# Patient Record
Sex: FEMALE | Employment: FULL TIME | URBAN - METROPOLITAN AREA
[De-identification: names, ages, dates, MRNs, and addresses within clinical notes are randomized per-mention and may not be internally consistent; named-entity substitution may affect disease eponyms.]

---

## 2019-02-06 ENCOUNTER — HOSPITAL ENCOUNTER (EMERGENCY)
Facility: HOSPITAL | Age: 54
Discharge: HOME | End: 2019-02-06
Attending: EMERGENCY MEDICINE
Payer: OTHER MISCELLANEOUS

## 2019-02-06 VITALS
HEIGHT: 67 IN | BODY MASS INDEX: 31.86 KG/M2 | DIASTOLIC BLOOD PRESSURE: 89 MMHG | RESPIRATION RATE: 18 BRPM | TEMPERATURE: 98 F | HEART RATE: 70 BPM | SYSTOLIC BLOOD PRESSURE: 196 MMHG | WEIGHT: 203 LBS

## 2019-02-06 DIAGNOSIS — M54.2 NECK PAIN ON RIGHT SIDE: ICD-10-CM

## 2019-02-06 DIAGNOSIS — M54.12 RIGHT CERVICAL RADICULOPATHY: Primary | ICD-10-CM

## 2019-02-06 PROCEDURE — 63600175 PHARM REV CODE 636 W HCPCS: Performed by: NURSE PRACTITIONER

## 2019-02-06 PROCEDURE — 99284 EMERGENCY DEPT VISIT MOD MDM: CPT | Mod: 25

## 2019-02-06 PROCEDURE — 96372 THER/PROPH/DIAG INJ SC/IM: CPT

## 2019-02-06 RX ORDER — KETOROLAC TROMETHAMINE 30 MG/ML
30 INJECTION, SOLUTION INTRAMUSCULAR; INTRAVENOUS
Status: COMPLETED | OUTPATIENT
Start: 2019-02-06 | End: 2019-02-06

## 2019-02-06 RX ORDER — ORPHENADRINE CITRATE 30 MG/ML
60 INJECTION INTRAMUSCULAR; INTRAVENOUS
Status: COMPLETED | OUTPATIENT
Start: 2019-02-06 | End: 2019-02-06

## 2019-02-06 RX ORDER — TRAMADOL HYDROCHLORIDE 50 MG/1
50 TABLET ORAL EVERY 6 HOURS
COMMUNITY
End: 2020-02-06

## 2019-02-06 RX ADMIN — ORPHENADRINE CITRATE 60 MG: 30 INJECTION INTRAMUSCULAR; INTRAVENOUS at 05:02

## 2019-02-06 RX ADMIN — KETOROLAC TROMETHAMINE 30 MG: 30 INJECTION, SOLUTION INTRAMUSCULAR at 05:02

## 2019-02-06 NOTE — ED TRIAGE NOTES
Patient reports right sided neck pain that radiates down right arm to right hand that began at approximately 1100 today.  Patient reports a hx of bulging discs in her neck.

## 2019-02-06 NOTE — ED PROVIDER NOTES
"Encounter Date: 2/6/2019    This is a SORT/MSE of a 53 y.o. female presenting to the ED with c/o right neck and arm pain. Pain worse with movement. Reports injury several months ago and pain has been ongoing since then.  No Chest Pain, SOB, Diaphoresis, or Nausea. Care will be transferred to an alternate provider when patient is roomed for a full evaluation and final disposition. JEAN Martinez, FNP-C 2/6/2019 3:01 PM    SCRIBE #1 NOTE: I, Ernesto Cruz, am scribing for, and in the presence of,  Jackson Mclaughlin NP. I have scribed the following portions of the note - Other sections scribed: HPI and ROS .       History     Chief Complaint   Patient presents with    Neck Pain     + R sided neck pain which radiates down R arm which started today at work, increased pain with movement. Hx of trauma, pain increased.      CC: Neck Pain     HPI: This 53 y.o F with HTN presents to the ED c/o acute on chronic right neck pan radiating down the right arm which began today. She describes her arm pain as "tingling." The pt states she began experiencing neck pain s/p a fall at work in July of 2018. She did receive an MRI which revealed a bulging disc in her neck and a slight tear in her rotator cuff. She did attend PT x3-4 months with no improvement because her pain is secondary to the bulging disc. Her othopedist recommend steroid injections, but her workers comp would not pay for it. The pt states she returned to work x2 weeks ago for light duty, but began cooking and lifting pots again which she suspects triggered the onset of her pain today. No new trauma. She notes that she usually takes her Rx'ed Tramadol Hydrochloride 50mg but can only take them at night due to side effects. She was previously Rx'ed gabapentin which she has at home, but she is unsure if she is allowed to combine both medications. She attempted tx with Aleve PTA with no relief.            The history is provided by the patient. No  was " used.     Review  of patient's allergies indicates:  No Known Allergies  Past Medical History:   Diagnosis Date    Hypertension      History reviewed. No pertinent surgical history.  Family History   Problem Relation Age of Onset    Hypertension Mother     Diabetes Mother     Hypertension Brother     Diabetes Brother     Hypertension Sister     Breast cancer Neg Hx     Colon cancer Neg Hx     Ovarian cancer Neg Hx      Social History     Tobacco Use    Smoking status: Never Smoker   Substance Use Topics    Alcohol use: Yes    Drug use: No     Review of Systems   Constitutional: Negative for chills, diaphoresis and fever.   HENT: Negative for rhinorrhea and sore throat.    Eyes: Negative for redness.   Respiratory: Negative for cough and shortness of breath.    Cardiovascular: Negative for chest pain.   Gastrointestinal: Negative for abdominal pain, diarrhea, nausea and vomiting.   Genitourinary: Negative for dysuria, frequency and urgency.   Musculoskeletal: Positive for neck pain (right side). Negative for back pain.        (+) R upper extremity pain   Skin: Negative for rash.   Psychiatric/Behavioral: The patient is not nervous/anxious.        Physical Exam     Initial Vitals [02/06/19 1501]   BP Pulse Resp Temp SpO2   (!) 196/89 70 18 97.9 °F (36.6 °C) --      MAP       --         Physical Exam    Nursing note and vitals reviewed.  Constitutional: Vital signs are normal. She appears well-developed and well-nourished. She is not diaphoretic. She is active and cooperative.  Non-toxic appearance. She does not have a sickly appearance. She does not appear ill. No distress.   Pleasant, well-appearing, no distress   HENT:   Head: Normocephalic and atraumatic.   Right Ear: External ear normal.   Left Ear: External ear normal.   Nose: Nose normal.   Eyes: Conjunctivae and EOM are normal. Right eye exhibits no discharge. Left eye exhibits no discharge.   Neck: Trachea normal, normal range of motion, full  passive range of motion without pain and phonation normal. Neck supple. No stridor present. Muscular tenderness (Right-sided) present. No tracheal tenderness and no spinous process tenderness present. No tracheal deviation, no edema, no erythema and normal range of motion present. No neck rigidity.   Cardiovascular: Normal rate.   Pulmonary/Chest: No stridor. No respiratory distress.   Abdominal: Soft. She exhibits no distension. There is no tenderness.   Musculoskeletal: Normal range of motion.        Cervical back: She exhibits tenderness and pain. She exhibits no bony tenderness and no deformity.   Right-sided neck pain radiating to the right shoulder and right upper extremity down to the hand.  Pain in the right upper extremity as described as a tingling, burning sensation.  Radial pulses are 2+ and equal bilaterally.  Strength is equal and normal in bilateral upper extremities. No posterior neck tenderness or midline tenderness.   Neurological: She is alert and oriented to person, place, and time. She has normal strength. No cranial nerve deficit or sensory deficit.   Skin: Skin is warm and dry.   Psychiatric: She has a normal mood and affect. Her behavior is normal. Judgment and thought content normal.         ED Course   Procedures  Labs Reviewed - No data to display       Imaging Results    None          Medical Decision Making:   History:   Old Medical Records: I decided to obtain old medical records.  Differential Diagnosis:   Fracture, dislocation, strain/sprain, paraspinal abscess, others  ED Management:  The is an emergent evaluation for a patient with right-sided neck pain radiating down the right upper extremity. I do not suspect an infectious, cancerous, or vascular etiology as the cause of the pain. The patient does not have a history of cancer or unexplained weight loss suggesting metastatic disease.  The patient does not have persistent fevers or night sweats. The patient is not immunocompromised  and the patient has not had any chronic steroid use or intravenous drug use.  I do not think this patient has an epidural abscess, osteomyelitis, or metastatic spine lesions. The patient has a normal neurological exam and does not show evidence of cord or root compression.  Strength is equal and normal in bilateral upper extremities. Radial pulses are 2+ bilaterally. Symptoms are clinically consistent with acute on chronic cervical radiculopathy.  Treated with Toradol and Norflex in the ED.      Given lack of new symptoms or new trauma I do not think emergent radiography with X-rays or CT scan is warranted at this time.  Advised patient to take at home pain medications as prescribed.  Advised patient to follow up with her PCP and orthopedic surgery for further evaluation management. ED return precautions given. All questions regarding diagnosis and plan were answered to the patient's fullest possible satisfaction. Patient expressed understanding of diagnosis, discharge instructions, and return precautions.    My attending physician was available for consultation during this case.            Scribe Attestation:   Scribe #1: I performed the above scribed service and the documentation accurately describes the services I performed. I attest to the accuracy of the note.    Attending Attestation:           Physician Attestation for Scribe:  Physician Attestation Statement for Scribe #1: I, Jackson Mclaughlin NP, reviewed documentation, as scribed by Ernesto Cruz in my presence, and it is both accurate and complete.                    Clinical Impression:   The primary encounter diagnosis was Right cervical radiculopathy. A diagnosis of Neck pain on right side was also pertinent to this visit.      Disposition:   Disposition: Discharged  Condition: Stable                        Jackson Mclaughlin NP  02/12/19 2002

## 2019-02-06 NOTE — DISCHARGE INSTRUCTIONS
Take your pain medications as needed only as prescribed.  Do not drive or drink alcohol when taking narcotics.    Follow-up with your orthopedic specialist for further treatment as discussed.  Return to the emergency department for any new or worsening symptoms or as needed for any additional concerns.    Thank you for coming to our Emergency Department today. It is important to remember that some problems are difficult to diagnose and may not be found during your first visit. Be sure to follow up with your primary care doctor.  If you do not have one, you may contact the one listed on your discharge paperwork or you may also call the [facility] Appointment Desk at [phone] to schedule an appointment with one.     Return to the ER with any questions/concerns, new/concerning symptoms, worsening or failure to improve. Do not drive or make any important decisions for 24 hours if you have received any pain medications, sedatives or mood altering drugs during your ER visit.

## 2020-02-06 ENCOUNTER — HOSPITAL ENCOUNTER (EMERGENCY)
Facility: HOSPITAL | Age: 55
Discharge: HOME | End: 2020-02-06
Attending: EMERGENCY MEDICINE
Payer: OTHER MISCELLANEOUS

## 2020-02-06 VITALS
BODY MASS INDEX: 31.55 KG/M2 | HEIGHT: 67 IN | OXYGEN SATURATION: 100 % | TEMPERATURE: 98 F | SYSTOLIC BLOOD PRESSURE: 118 MMHG | DIASTOLIC BLOOD PRESSURE: 56 MMHG | HEART RATE: 64 BPM | WEIGHT: 201 LBS | RESPIRATION RATE: 18 BRPM

## 2020-02-06 DIAGNOSIS — M25.511 RIGHT SHOULDER PAIN: ICD-10-CM

## 2020-02-06 DIAGNOSIS — S43.401A SPRAIN OF RIGHT SHOULDER, UNSPECIFIED SHOULDER SPRAIN TYPE, INITIAL ENCOUNTER: Primary | ICD-10-CM

## 2020-02-06 PROCEDURE — 99284 EMERGENCY DEPT VISIT MOD MDM: CPT | Mod: 25

## 2020-02-06 PROCEDURE — 25000003 PHARM REV CODE 250: Performed by: EMERGENCY MEDICINE

## 2020-02-06 RX ORDER — HYDROCODONE BITARTRATE AND ACETAMINOPHEN 5; 325 MG/1; MG/1
1 TABLET ORAL
Status: COMPLETED | OUTPATIENT
Start: 2020-02-06 | End: 2020-02-06

## 2020-02-06 RX ORDER — CLONIDINE HYDROCHLORIDE 0.1 MG/1
0.1 TABLET ORAL
Status: COMPLETED | OUTPATIENT
Start: 2020-02-06 | End: 2020-02-06

## 2020-02-06 RX ORDER — LOSARTAN POTASSIUM 50 MG/1
50 TABLET ORAL DAILY
Qty: 30 TABLET | Refills: 3 | Status: SHIPPED | OUTPATIENT
Start: 2020-02-06 | End: 2022-11-07 | Stop reason: ALTCHOICE

## 2020-02-06 RX ORDER — TRAMADOL HYDROCHLORIDE 50 MG/1
50 TABLET ORAL EVERY 6 HOURS PRN
Qty: 10 TABLET | Refills: 0 | Status: SHIPPED | OUTPATIENT
Start: 2020-02-06 | End: 2020-02-16

## 2020-02-06 RX ADMIN — CLONIDINE HYDROCHLORIDE 0.1 MG: 0.1 TABLET ORAL at 06:02

## 2020-02-06 RX ADMIN — HYDROCODONE BITARTRATE AND ACETAMINOPHEN 1 TABLET: 5; 325 TABLET ORAL at 06:02

## 2020-02-06 NOTE — ED TRIAGE NOTES
Pt arrived via EMS c/o right shoulder pain that radiates up into neck after lifting heavy boxes at work. Swelling and deformity noted to right shoulder, no crepitus noted. RUE in sling upon arrival. Skin color is WNL for pt's ethnicity, skin is cool to the touch, cap refill is <2 sec and no sensory deficits noted.

## 2020-02-06 NOTE — DISCHARGE INSTRUCTIONS
Please return immediately if youGet worse or if new problems develop.  Please follow-up with your primary care doctor this week.  Rest.  Use a heating pad.  Tramadol for pain. Please increase her losartan to 50 mg daily.  Rest.  Please also use Tylenol, 1000 mg every 8 hr.

## 2020-02-06 NOTE — ED PROVIDER NOTES
Encounter Date: 2/6/2020    SCRIBE #1 NOTE: I, Amarilys Rodriguez, am scribing for, and in the presence of,  Pascual Chao MD. I have scribed the following portions of the note - Other sections scribed: HPI and ROS.       History     Chief Complaint   Patient presents with    Shoulder Pain     right shoulder pain after picking up heavy boxes today     CC: Shoulder Pain    HPI: This 54 y.o female, with a medical history of hypertension, presents to the ED c/o acute, constant right shoulder pain. Pt reports that she was picking up heavy boxes in a freezer at work at 5:15 am this morning. She states that when she bent down to  more boxes she began to experience pain to the right shoulder. She notes that the pain has since been radiating into the the right upper arm as well as into the right side of the neck. No other extremity issues. Pt denies chest pain, cough, shortness of breath, abdominal pain, emesis, diarrhea, dysuria, sore throat, fever and chills. No other associated symptoms. No alleviating factors.     The history is provided by the patient.     Review of patient's allergies indicates:  No Known Allergies  Past Medical History:   Diagnosis Date    Hypertension      Past Surgical History:   Procedure Laterality Date    KNEE CARTILAGE SURGERY Bilateral     RETINAL DETACHMENT SURGERY Right      Family History   Problem Relation Age of Onset    Hypertension Mother     Diabetes Mother     Hypertension Brother     Diabetes Brother     Hypertension Sister     Breast cancer Neg Hx     Colon cancer Neg Hx     Ovarian cancer Neg Hx      Social History     Tobacco Use    Smoking status: Never Smoker    Smokeless tobacco: Never Used   Substance Use Topics    Alcohol use: Yes    Drug use: No     Review of Systems   Constitutional: Negative for chills and fever.   HENT: Negative for congestion, rhinorrhea and sore throat.    Eyes: Negative for visual disturbance.   Respiratory: Negative for cough and  shortness of breath.    Cardiovascular: Negative for chest pain.   Gastrointestinal: Negative for abdominal pain, diarrhea, nausea and vomiting.   Genitourinary: Negative for dysuria.   Musculoskeletal: Negative for back pain.        (+) right shoulder pain radiating into the right upper arm and into the right side of the neck   Skin: Negative for rash.   Neurological: Negative for dizziness, weakness and headaches.       Physical Exam     Initial Vitals [02/06/20 0630]   BP Pulse Resp Temp SpO2   (!) 208/92 63 17 97.8 °F (36.6 °C) 100 %      MAP       --         Physical Exam  The patient was examined specifically for the following:   General:No significant distress, Good color, Warm and dry. Head and neck:Scalp atraumatic, Neck supple. Neurological:Appropriate conversation, Gross motor deficits. Eyes:Conjugate gaze, Clear corneas. ENT: No epistaxis. Cardiac: Regular rate and rhythm, Grossly normal heart tones. Pulmonary: Wheezing, Rales. Gastrointestinal: Abdominal tenderness, Abdominal distention. Musculoskeletal: Extremity deformity, Apparent pain with range of motion of the joints. Skin: Rash.   The findings on examination were normal except for the following:  Patient has tenderness and pain with range of motion of the right shoulder.  She is tender into the humeral arm and up the right side of her neck.  The superior border the trapezius is tender.  ED Course   Procedures  Labs Reviewed - No data to display       Imaging Results          X-Ray Shoulder Trauma Right (Final result)  Result time 02/06/20 07:40:32    Final result by Brandy Singh MD (02/06/20 07:40:32)                 Impression:      No definite radiographic evidence of acute osseous injury within the limitations of the examination.      Electronically signed by: Brandy Singh MD  Date:    02/06/2020  Time:    07:40             Narrative:    EXAMINATION:  XR SHOULDER TRAUMA 3 VIEW RIGHT    CLINICAL HISTORY:  Pain in right  shoulder    TECHNIQUE:  Two views of the right shoulder were performed.    COMPARISON:  None    FINDINGS:  Please note examination is limited due to patient positioning.  Allowing for this, the humeral head appears appropriately seated within the glenoid.  No radiographic evidence of acute fracture.  The acromioclavicular joint appears maintained.                              Medical decision making:  Given the above, this patient presents to the emergency room with pain in the right shoulder.  X-rays failed to reveal significant abnormalities.  I believe this patient has a muscular shoulder strain.  She is also discovered to have high blood pressure today.  She is on losartan 25 mg.  I will increase her to 50 mg.  I will treat with Tramadol have the patient follow up with primary care.                    Scribe Attestation:   Scribe #1: I performed the above scribed service and the documentation accurately describes the services I performed. I attest to the accuracy of the note.                          Clinical Impression:       ICD-10-CM ICD-9-CM   1. Sprain of right shoulder, unspecified shoulder sprain type, initial encounter S43.401A 840.9   2. Right shoulder pain M25.511 719.41            I personally performed the services described in this documentation.  All medical record  entries made by the scribe are at my direction and in my presence.  Signed, Dr. Jeremie Chao MD  02/06/20 0878

## 2021-03-05 ENCOUNTER — IMMUNIZATION (OUTPATIENT)
Dept: PRIMARY CARE CLINIC | Facility: CLINIC | Age: 56
End: 2021-03-05

## 2021-03-05 DIAGNOSIS — Z23 NEED FOR VACCINATION: Primary | ICD-10-CM

## 2021-03-05 PROCEDURE — 91303 PR SARSCOV2 VAC AD26 .5ML IM: ICD-10-PCS | Mod: S$GLB,,, | Performed by: INTERNAL MEDICINE

## 2021-03-05 PROCEDURE — 0031A PR IMMUNIZ ADMIN, SARS-COV-2 COVID-19 VACC, 5X10VP/0.5ML: CPT | Mod: CV19,S$GLB,, | Performed by: INTERNAL MEDICINE

## 2021-03-05 PROCEDURE — 0031A PR IMMUNIZ ADMIN, SARS-COV-2 COVID-19 VACC, 5X10VP/0.5ML: ICD-10-PCS | Mod: CV19,S$GLB,, | Performed by: INTERNAL MEDICINE

## 2021-03-05 PROCEDURE — 91303 PR SARSCOV2 VAC AD26 .5ML IM: CPT | Mod: S$GLB,,, | Performed by: INTERNAL MEDICINE

## 2021-12-23 ENCOUNTER — HOSPITAL ENCOUNTER (EMERGENCY)
Facility: HOSPITAL | Age: 56
Discharge: HOME | End: 2021-12-23
Attending: EMERGENCY MEDICINE
Payer: COMMERCIAL

## 2021-12-23 VITALS
BODY MASS INDEX: 32.18 KG/M2 | RESPIRATION RATE: 18 BRPM | HEART RATE: 61 BPM | WEIGHT: 205 LBS | SYSTOLIC BLOOD PRESSURE: 150 MMHG | DIASTOLIC BLOOD PRESSURE: 72 MMHG | OXYGEN SATURATION: 100 % | TEMPERATURE: 98 F | HEIGHT: 67 IN

## 2021-12-23 DIAGNOSIS — R07.9 CHEST PAIN: ICD-10-CM

## 2021-12-23 DIAGNOSIS — R07.89 ATYPICAL CHEST PAIN: Primary | ICD-10-CM

## 2021-12-23 LAB
ALBUMIN SERPL BCP-MCNC: 3.9 G/DL (ref 3.5–5.2)
ALP SERPL-CCNC: 85 U/L (ref 55–135)
ALT SERPL W/O P-5'-P-CCNC: 14 U/L (ref 10–44)
ANION GAP SERPL CALC-SCNC: 10 MMOL/L (ref 8–16)
AST SERPL-CCNC: 17 U/L (ref 10–40)
B-HCG UR QL: NEGATIVE
BASOPHILS # BLD AUTO: 0.04 K/UL (ref 0–0.2)
BASOPHILS NFR BLD: 0.4 % (ref 0–1.9)
BILIRUB SERPL-MCNC: 0.3 MG/DL (ref 0.1–1)
BUN SERPL-MCNC: 16 MG/DL (ref 6–20)
CALCIUM SERPL-MCNC: 9.7 MG/DL (ref 8.7–10.5)
CHLORIDE SERPL-SCNC: 109 MMOL/L (ref 95–110)
CO2 SERPL-SCNC: 23 MMOL/L (ref 23–29)
CREAT SERPL-MCNC: 0.8 MG/DL (ref 0.5–1.4)
CTP QC/QA: YES
CTP QC/QA: YES
DIFFERENTIAL METHOD: ABNORMAL
EOSINOPHIL # BLD AUTO: 0.2 K/UL (ref 0–0.5)
EOSINOPHIL NFR BLD: 1.3 % (ref 0–8)
ERYTHROCYTE [DISTWIDTH] IN BLOOD BY AUTOMATED COUNT: 14.9 % (ref 11.5–14.5)
EST. GFR  (AFRICAN AMERICAN): >60 ML/MIN/1.73 M^2
EST. GFR  (NON AFRICAN AMERICAN): >60 ML/MIN/1.73 M^2
GLUCOSE SERPL-MCNC: 96 MG/DL (ref 70–110)
HCT VFR BLD AUTO: 34.9 % (ref 37–48.5)
HGB BLD-MCNC: 10.7 G/DL (ref 12–16)
IMM GRANULOCYTES # BLD AUTO: 0.04 K/UL (ref 0–0.04)
IMM GRANULOCYTES NFR BLD AUTO: 0.4 % (ref 0–0.5)
LYMPHOCYTES # BLD AUTO: 5 K/UL (ref 1–4.8)
LYMPHOCYTES NFR BLD: 43.8 % (ref 18–48)
MCH RBC QN AUTO: 21.9 PG (ref 27–31)
MCHC RBC AUTO-ENTMCNC: 30.7 G/DL (ref 32–36)
MCV RBC AUTO: 71 FL (ref 82–98)
MONOCYTES # BLD AUTO: 0.9 K/UL (ref 0.3–1)
MONOCYTES NFR BLD: 7.9 % (ref 4–15)
NEUTROPHILS # BLD AUTO: 5.2 K/UL (ref 1.8–7.7)
NEUTROPHILS NFR BLD: 46.2 % (ref 38–73)
NRBC BLD-RTO: 0 /100 WBC
PLATELET # BLD AUTO: 278 K/UL (ref 150–450)
PMV BLD AUTO: 10.3 FL (ref 9.2–12.9)
POTASSIUM SERPL-SCNC: 4 MMOL/L (ref 3.5–5.1)
PROT SERPL-MCNC: 7.9 G/DL (ref 6–8.4)
RBC # BLD AUTO: 4.89 M/UL (ref 4–5.4)
SARS-COV-2 RDRP RESP QL NAA+PROBE: NEGATIVE
SODIUM SERPL-SCNC: 142 MMOL/L (ref 136–145)
TROPONIN I SERPL DL<=0.01 NG/ML-MCNC: 0.01 NG/ML (ref 0–0.03)
WBC # BLD AUTO: 11.29 K/UL (ref 3.9–12.7)

## 2021-12-23 PROCEDURE — 93005 ELECTROCARDIOGRAM TRACING: CPT

## 2021-12-23 PROCEDURE — 63600175 PHARM REV CODE 636 W HCPCS: Performed by: EMERGENCY MEDICINE

## 2021-12-23 PROCEDURE — 93010 ELECTROCARDIOGRAM REPORT: CPT | Mod: ,,, | Performed by: INTERNAL MEDICINE

## 2021-12-23 PROCEDURE — 81025 URINE PREGNANCY TEST: CPT | Performed by: EMERGENCY MEDICINE

## 2021-12-23 PROCEDURE — U0002 COVID-19 LAB TEST NON-CDC: HCPCS | Performed by: EMERGENCY MEDICINE

## 2021-12-23 PROCEDURE — 80053 COMPREHEN METABOLIC PANEL: CPT | Performed by: EMERGENCY MEDICINE

## 2021-12-23 PROCEDURE — 84484 ASSAY OF TROPONIN QUANT: CPT | Performed by: EMERGENCY MEDICINE

## 2021-12-23 PROCEDURE — 99285 EMERGENCY DEPT VISIT HI MDM: CPT | Mod: 25

## 2021-12-23 PROCEDURE — 85025 COMPLETE CBC W/AUTO DIFF WBC: CPT | Performed by: EMERGENCY MEDICINE

## 2021-12-23 PROCEDURE — 93010 EKG 12-LEAD: ICD-10-PCS | Mod: ,,, | Performed by: INTERNAL MEDICINE

## 2021-12-23 PROCEDURE — 96374 THER/PROPH/DIAG INJ IV PUSH: CPT

## 2021-12-23 RX ORDER — IBUPROFEN 800 MG/1
800 TABLET ORAL EVERY 6 HOURS PRN
Qty: 20 TABLET | Refills: 0 | Status: SHIPPED | OUTPATIENT
Start: 2021-12-23 | End: 2022-11-07

## 2021-12-23 RX ORDER — METHOCARBAMOL 750 MG/1
1500 TABLET, FILM COATED ORAL EVERY 6 HOURS
Qty: 24 TABLET | Refills: 0 | Status: SHIPPED | OUTPATIENT
Start: 2021-12-23 | End: 2021-12-26

## 2021-12-23 RX ORDER — KETOROLAC TROMETHAMINE 30 MG/ML
15 INJECTION, SOLUTION INTRAMUSCULAR; INTRAVENOUS
Status: COMPLETED | OUTPATIENT
Start: 2021-12-23 | End: 2021-12-23

## 2021-12-23 RX ADMIN — KETOROLAC TROMETHAMINE 15 MG: 30 INJECTION, SOLUTION INTRAMUSCULAR at 08:12

## 2021-12-24 NOTE — ED TRIAGE NOTES
Patient identifiers for Emilie Taveras 56 y.o. female checked and correct.  Chief Complaint   Patient presents with    Chest Pain     Patient c/o intermittent substernal chest pressure radiating to the back that started 2 days ago and today was the worst per patient. Pressure worse when standing. Mild sob and nausea when episode occurs. Denied any V/D     Past Medical History:   Diagnosis Date    Hypertension      Allergies reported: Review of patient's allergies indicates:  No Known Allergies      LOC: Patient is awake, alert, and aware of environment with an appropriate affect. Patient is oriented x 3 and speaking appropriately.  APPEARANCE: Patient resting comfortably and in no acute distress. Patient is clean and well groomed, patient's clothing is properly fastened.  HEENT: Pt reports mild headache  SKIN: The skin is warm and dry. Patient has normal skin turgor and moist mucus membranes. Skin is intact; no bruising or breakdown noted.  MUSKULOSKELETAL: Patient is moving all extremities well, no obvious deformities noted. Pulses intact.   RESPIRATORY: Airway is open and patent. Respirations are spontaneous and non-labored with normal effort and rate. Pt reports intermittent SOB  CARDIAC: Patient has a normal rate and rhythm. Pt on cardiac monitor,No peripheral edema noted. Pt reports +chest pain x 3 days that is described as pressure, worse when standing  ABDOMEN: No distention noted. Bowel sounds active in all 4 quadrants. Soft and non-tender upon palpation. Pt reports mild nausea  NEUROLOGICAL:  PERRL. Facial expression is symmetrical. Hand grasps are equal bilaterally. Normal sensation in all extremities when touched with finger.

## 2021-12-24 NOTE — ED PROVIDER NOTES
"Encounter Date: 12/23/2021    SCRIBE #1 NOTE: I, Talia Cantor, am scribing for, and in the presence of, Marsha Harvey MD.       History     Chief Complaint   Patient presents with    Chest Pain     Patient c/o intermittent substernal chest pressure radiating to the back that started 2 days ago and today was the worst per patient. Pressure worse when standing. Mild sob and nausea when episode occurs. Denied any V/D     Emilie Taveras is a 56 y.o. female, with HTN, who presents to the ED with acute chest pain. Patient reports intermittent, 5/10 mid-sternal chest "deep pressure" pain with radiation to her back onset 1 day ago. She states her pain became more constant earlier today, prompting her to the ED. Patient further reports associated nausea, slight shortness of breath. No attempted Tx. She reports recent medication change from Losartan to lisinopril-hydrochlorothiazide 1 week ago. She also states she had a similar presentation of pain 1 m.o. ago, and states she saw her PCP and was told she had Costochondritis; was given an injection for Tx at the time. No other exacerbating or alleviating factors. Patient denies EtOH, cigarette, or recreational drug use. Denies vomiting, palpitations, leg swelling, abdominal pain, or other associated symptoms. No birth control use. No Hx of MI, stroke, or other cardiac disease. No FHx of cardiac disease.     The history is provided by the patient.     Review of patient's allergies indicates:  No Known Allergies  Past Medical History:   Diagnosis Date    Hypertension      Past Surgical History:   Procedure Laterality Date    KNEE CARTILAGE SURGERY Bilateral     RETINAL DETACHMENT SURGERY Right      Family History   Problem Relation Age of Onset    Hypertension Mother     Diabetes Mother     Hypertension Brother     Diabetes Brother     Hypertension Sister     Breast cancer Neg Hx     Colon cancer Neg Hx     Ovarian cancer Neg Hx      Social History     Tobacco Use    " Smoking status: Never Smoker    Smokeless tobacco: Never Used   Substance Use Topics    Alcohol use: Yes    Drug use: No     Review of Systems   Constitutional: Negative for diaphoresis.   Respiratory: Positive for shortness of breath. Negative for cough.    Cardiovascular: Positive for chest pain. Negative for palpitations and leg swelling.   Gastrointestinal: Positive for nausea. Negative for abdominal pain and vomiting.   Musculoskeletal: Positive for back pain.   Neurological: Negative for dizziness and syncope.   All other systems reviewed and are negative.      Physical Exam     Initial Vitals [12/23/21 1623]   BP Pulse Resp Temp SpO2   (!) 141/74 62 16 98 °F (36.7 °C) 99 %      MAP       --         Physical Exam    Nursing note and vitals reviewed.  Constitutional: She appears well-developed and well-nourished. She is not diaphoretic. No distress.   HENT:   Mouth/Throat: Oropharynx is clear and moist.   Eyes: Pupils are equal, round, and reactive to light.   Neck: Neck supple.   Cardiovascular: Normal rate and regular rhythm.   Murmur heard.   Systolic murmur is present with a grade of 2/6.  Pulmonary/Chest: Breath sounds normal. She exhibits tenderness (that reproduces patient's chest pain).   Abdominal: Abdomen is soft. There is no abdominal tenderness. There is no rebound and no guarding.   Musculoskeletal:         General: No edema.      Cervical back: Neck supple.      Thoracic back: Tenderness (paraspinal) present.     Neurological: She is alert and oriented to person, place, and time.   Skin: Skin is warm and dry.   Psychiatric: She has a normal mood and affect.         ED Course   Procedures  Labs Reviewed   CBC W/ AUTO DIFFERENTIAL - Abnormal; Notable for the following components:       Result Value    Hemoglobin 10.7 (*)     Hematocrit 34.9 (*)     MCV 71 (*)     MCH 21.9 (*)     MCHC 30.7 (*)     RDW 14.9 (*)     Lymph # 5.0 (*)     All other components within normal limits   COMPREHENSIVE  METABOLIC PANEL   TROPONIN I   POCT URINE PREGNANCY   SARS-COV-2 RDRP GENE     EKG Readings: (Independently Interpreted)   Initial Reading: No STEMI. Rhythm: Normal Sinus Rhythm. Heart Rate: 70. Axis: Normal.     ECG Results          EKG 12-lead (Final result)  Result time 12/27/21 17:54:05    Final result by Interface, Lab In [facility] (12/27/21 17:54:05)                 Narrative:    Test Reason : R07.9,    Vent. Rate : 070 BPM     Atrial Rate : 070 BPM     P-R Int : 154 ms          QRS Dur : 082 ms      QT Int : 402 ms       P-R-T Axes : 048 062 036 degrees     QTc Int : 434 ms    Normal sinus rhythm  Normal ECG  No previous ECGs available  Confirmed by Clay Vasquez MD ([id number]) on 12/27/2021 5:53:57 PM    Referred By: SYLVAIN   SELF           Confirmed By:Clay Vasquez MD                             EKG 12-LEAD (Final result)  Result time 01/06/22 08:32:59    Final result by Unknown User (01/06/22 08:32:59)                                Imaging Results          X-Ray Chest PA And Lateral (Final result)  Result time 12/23/21 17:37:05    Final result by Catina Brown MD (12/23/21 17:37:05)                 Impression:      No acute intrathoracic abnormality.      Electronically signed by: Catina Brown  Date:    12/23/2021  Time:    17:37             Narrative:    EXAMINATION:  CHEST PA AND LATERAL    CLINICAL HISTORY:  Chest Pain;    TECHNIQUE:  PA and lateral chest radiograph    COMPARISON:  <None.>    FINDINGS:  The cardiac silhouette is within normal limits.   There is no focal consolidation, pneumothorax, or pleural effusion.                                 Medications   ketorolac injection 15 mg (15 mg Intravenous Given 12/23/21 2039)     Medical Decision Making:   History:   Old Medical Records: I decided to obtain old medical records.  Independently Interpreted Test(s):   I have ordered and independently interpreted EKG Reading(s) - see prior notes  Clinical Tests:   Lab Tests: Ordered and  Reviewed  Radiological Study: Ordered and Reviewed  Medical Tests: Ordered and Reviewed    Labs Reviewed      Admission on 12/23/2021, Discharged on 12/23/2021   Component Date Value Ref Range Status    WBC 12/23/2021 11.29  3.90 - 12.70 K/uL Final    RBC 12/23/2021 4.89  4.00 - 5.40 M/uL Final    Hemoglobin 12/23/2021 10.7* 12.0 - 16.0 g/dL Final    Hematocrit 12/23/2021 34.9* 37.0 - 48.5 % Final    MCV 12/23/2021 71* 82 - 98 fL Final    MCH 12/23/2021 21.9* 27.0 - 31.0 pg Final    MCHC 12/23/2021 30.7* 32.0 - 36.0 g/dL Final    RDW 12/23/2021 14.9* 11.5 - 14.5 % Final    Platelets 12/23/2021 278  150 - 450 K/uL Final    MPV 12/23/2021 10.3  9.2 - 12.9 fL Final    Immature Granulocytes 12/23/2021 0.4  0.0 - 0.5 % Final    Gran # (ANC) 12/23/2021 5.2  1.8 - 7.7 K/uL Final    Immature Grans (Abs) 12/23/2021 0.04  0.00 - 0.04 K/uL Final    Comment: Mild elevation in immature granulocytes is non specific and   can be seen in a variety of conditions including stress response,   acute inflammation, trauma and pregnancy. Correlation with other   laboratory and clinical findings is essential.      Lymph # 12/23/2021 5.0* 1.0 - 4.8 K/uL Final    Mono # 12/23/2021 0.9  0.3 - 1.0 K/uL Final    Eos # 12/23/2021 0.2  0.0 - 0.5 K/uL Final    Baso # 12/23/2021 0.04  0.00 - 0.20 K/uL Final    nRBC 12/23/2021 0  0 /100 WBC Final    Gran % 12/23/2021 46.2  38.0 - 73.0 % Final    Lymph % 12/23/2021 43.8  18.0 - 48.0 % Final    Mono % 12/23/2021 7.9  4.0 - 15.0 % Final    Eosinophil % 12/23/2021 1.3  0.0 - 8.0 % Final    Basophil % 12/23/2021 0.4  0.0 - 1.9 % Final    Differential Method 12/23/2021 Automated   Final    Sodium 12/23/2021 142  136 - 145 mmol/L Final    Potassium 12/23/2021 4.0  3.5 - 5.1 mmol/L Final    Chloride 12/23/2021 109  95 - 110 mmol/L Final    CO2 12/23/2021 23  23 - 29 mmol/L Final    Glucose 12/23/2021 96  70 - 110 mg/dL Final    BUN 12/23/2021 16  6 - 20 mg/dL Final     Creatinine 12/23/2021 0.8  0.5 - 1.4 mg/dL Final    Calcium 12/23/2021 9.7  8.7 - 10.5 mg/dL Final    Total Protein 12/23/2021 7.9  6.0 - 8.4 g/dL Final    Albumin 12/23/2021 3.9  3.5 - 5.2 g/dL Final    Total Bilirubin 12/23/2021 0.3  0.1 - 1.0 mg/dL Final    Comment: For infants and newborns, interpretation of results should be based  on gestational age, weight and in agreement with clinical  observations.    Premature Infant recommended reference ranges:  Up to 24 hours.............<8.0 mg/dL  Up to 48 hours............<12.0 mg/dL  3-5 days..................<15.0 mg/dL  6-29 days.................<15.0 mg/dL      Alkaline Phosphatase 12/23/2021 85  55 - 135 U/L Final    AST 12/23/2021 17  10 - 40 U/L Final    ALT 12/23/2021 14  10 - 44 U/L Final    Anion Gap 12/23/2021 10  8 - 16 mmol/L Final    eGFR if African American 12/23/2021 >60  >60 mL/min/1.73 m^2 Final    eGFR if non African American 12/23/2021 >60  >60 mL/min/1.73 m^2 Final    Comment: Calculation used to obtain the estimated glomerular filtration  rate (eGFR) is the CKD-EPI equation.       Troponin I 12/23/2021 0.015  0.000 - 0.026 ng/mL Final    Comment: The reference interval for Troponin I represents the 99th percentile   cutoff   for our facility and is consistent with 3rd generation assay   performance.      POC Preg Test, Ur 12/23/2021 Negative  Negative Final     Acceptable 12/23/2021 Yes   Final    POC Rapid COVID 12/23/2021 Negative  Negative Final     Acceptable 12/23/2021 Yes   Final        Imaging Reviewed    Imaging Results          X-Ray Chest PA And Lateral (Final result)  Result time 12/23/21 17:37:05    Final result by Catina Brown MD (12/23/21 17:37:05)                 Impression:      No acute intrathoracic abnormality.      Electronically signed by: Catina Brown  Date:    12/23/2021  Time:    17:37             Narrative:    EXAMINATION:  CHEST PA AND LATERAL    CLINICAL  HISTORY:  Chest Pain;    TECHNIQUE:  PA and lateral chest radiograph    COMPARISON:  <None.>    FINDINGS:  The cardiac silhouette is within normal limits.   There is no focal consolidation, pneumothorax, or pleural effusion.                                Medications given in ED    Medications   ketorolac injection 15 mg (15 mg Intravenous Given 12/23/21 2039)         Note was created using voice recognition software. Note may have occasional typographical errors that may not have been identified and edited despite good cora initial review prior to signing.       Additional MDM:     Well's Criteria Score:  -Clinical symptoms of DVT (leg swelling, pain with palpation) = 0.0  -Other diagnosis less likely than pulmonary embolism =            0.0  -Heart Rate >100 =   0.0  -Immobilization (= or > than 3 days) or surgery in the previous 4 weeks = 0.0  -Previous DVT/PE = 0.0  -Hemoptysis =          0.0  -Malignancy =           0.0  Well's Probability Score =    0      Heart Score:    History:          Slightly suspicious.  ECG:             Normal  Age:               45-65 years  Risk factors: 1-2 risk factors  Troponin:       Less than or equal to normal limit  Final Score: 2           Scribe Attestation:   Scribe #1: I performed the above scribed service and the documentation accurately describes the services I performed. I attest to the accuracy of the note.               I,Marsha Harvey , personally performed the services described in this documentation. All medical record entries made by the scribe were at my direction and in my presence. I have reviewed the chart and agree that the record reflects my personal performance and is accurate and complete.    Clinical Impression:   Final diagnoses:  [R07.9] Chest pain  [R07.89] Atypical chest pain (Primary)          ED Disposition Condition    Discharge Stable        ED Prescriptions     Medication Sig Dispense Start Date End Date Auth. Provider    methocarbamoL (ROBAXIN)  750 MG Tab () Take 2 tablets (1,500 mg total) by mouth every 6 (six) hours. for 3 days 24 tablet 2021 Marsha Harvey MD    ibuprofen (ADVIL,MOTRIN) 800 MG tablet Take 1 tablet (800 mg total) by mouth every 6 (six) hours as needed for Pain. 20 tablet 2021  Marsha Harvey MD        Follow-up Information     Follow up With Specialties Details Why Contact Info Additional Information    Your PCP  Call in 1 day to schedule an appointment, for re-evaluation of today's complaint, and ongoing care      [facility] - Cardiology Cardiology Call in 1 day to schedule an appointment, for re-evaluation of today's complaint, to arrange outpatient stress test within 72 hrs [address]  [facility] [address] [phone] Please park in garage or Medical Office Bldg. surface lot and use Medical Ofc Bldg elevator. Check in at [facility] [address].    [facility] Emergency Dept Emergency Medicine Go to  As needed, If symptoms worsen [address]  [facility] [address] [phone]            Marsha Harvey MD  [phone]

## 2022-01-05 ENCOUNTER — LAB VISIT (OUTPATIENT)
Dept: PRIMARY CARE CLINIC | Facility: OTHER | Age: 57
End: 2022-01-05
Attending: INTERNAL MEDICINE
Payer: COMMERCIAL

## 2022-01-05 DIAGNOSIS — Z20.822 ENCOUNTER FOR LABORATORY TESTING FOR COVID-19 VIRUS: ICD-10-CM

## 2022-01-05 PROCEDURE — U0003 INFECTIOUS AGENT DETECTION BY NUCLEIC ACID (DNA OR RNA); SEVERE ACUTE RESPIRATORY SYNDROME CORONAVIRUS 2 (SARS-COV-2) (CORONAVIRUS DISEASE [COVID-19]), AMPLIFIED PROBE TECHNIQUE, MAKING USE OF HIGH THROUGHPUT TECHNOLOGIES AS DESCRIBED BY CMS-2020-01-R: HCPCS | Performed by: INTERNAL MEDICINE

## 2022-01-08 LAB
SARS-COV-2 RNA RESP QL NAA+PROBE: DETECTED
SARS-COV-2- CYCLE NUMBER: 20

## 2022-10-12 ENCOUNTER — TELEPHONE (OUTPATIENT)
Dept: RHEUMATOLOGY | Facility: CLINIC | Age: 57
End: 2022-10-12
Payer: COMMERCIAL

## 2022-10-12 NOTE — TELEPHONE ENCOUNTER
"Returned patients phone call. Advised patient that as of right now Dr. Villafuerte next available at our [place] location is 02/06/2023 at 10:45. Offered to schedule patient with one of the Physician Assistants since they have sooner availability. Patient declined as she would only like to see Dr. Bird. Appointment with Dr. Bird booked for 02/06/2023 at 10:45 at our [place] location. All questions answered.      Ivon Oliveira (Allye), [facility]  Rheumatology Department    "

## 2022-10-12 NOTE — TELEPHONE ENCOUNTER
----- Message from Scarlett Del Real sent at 10/12/2022  4:50 PM CDT -----  Contact: self  Type:  Sooner Apoointment Request    Caller is requesting a sooner appointment.  Caller declined first available appointment listed below.  Caller will not accept being placed on the waitlist and is requesting a message be sent to doctor.  Name of Caller:Patient  When is the first available appointment?2/6/23  Symptoms: arthritis  Would the patient rather a call back or a response via [facility]? Call back  Best Call Back Number: [phone]  Additional Information:  Patient is traveling from [place] and if possible would like to been seen before the year is over..

## 2022-11-07 ENCOUNTER — OFFICE VISIT (OUTPATIENT)
Dept: RHEUMATOLOGY | Facility: CLINIC | Age: 57
End: 2022-11-07
Payer: COMMERCIAL

## 2022-11-07 ENCOUNTER — HOSPITAL ENCOUNTER (OUTPATIENT)
Dept: RADIOLOGY | Facility: HOSPITAL | Age: 57
Discharge: HOME | End: 2022-11-07
Attending: INTERNAL MEDICINE
Payer: COMMERCIAL

## 2022-11-07 VITALS — HEIGHT: 67 IN | BODY MASS INDEX: 32.11 KG/M2 | WEIGHT: 204.56 LBS

## 2022-11-07 DIAGNOSIS — G89.29 CHRONIC MIDLINE LOW BACK PAIN WITHOUT SCIATICA: Primary | ICD-10-CM

## 2022-11-07 DIAGNOSIS — M54.50 CHRONIC MIDLINE LOW BACK PAIN WITHOUT SCIATICA: ICD-10-CM

## 2022-11-07 DIAGNOSIS — M25.50 POLYARTHRALGIA: ICD-10-CM

## 2022-11-07 DIAGNOSIS — M54.50 CHRONIC MIDLINE LOW BACK PAIN WITHOUT SCIATICA: Primary | ICD-10-CM

## 2022-11-07 DIAGNOSIS — G89.29 CHRONIC MIDLINE LOW BACK PAIN WITHOUT SCIATICA: ICD-10-CM

## 2022-11-07 PROCEDURE — 4010F ACE/ARB THERAPY RXD/TAKEN: CPT | Mod: CPTII,S$GLB,, | Performed by: INTERNAL MEDICINE

## 2022-11-07 PROCEDURE — 1160F PR REVIEW ALL MEDS BY PRESCRIBER/CLIN PHARMACIST DOCUMENTED: ICD-10-PCS | Mod: CPTII,S$GLB,, | Performed by: INTERNAL MEDICINE

## 2022-11-07 PROCEDURE — 4010F PR ACE/ARB THEARPY RXD/TAKEN: ICD-10-PCS | Mod: CPTII,S$GLB,, | Performed by: INTERNAL MEDICINE

## 2022-11-07 PROCEDURE — 1160F RVW MEDS BY RX/DR IN RCRD: CPT | Mod: CPTII,S$GLB,, | Performed by: INTERNAL MEDICINE

## 2022-11-07 PROCEDURE — 1159F PR MEDICATION LIST DOCUMENTED IN MEDICAL RECORD: ICD-10-PCS | Mod: CPTII,S$GLB,, | Performed by: INTERNAL MEDICINE

## 2022-11-07 PROCEDURE — 99214 OFFICE O/P EST MOD 30 MIN: CPT | Mod: S$GLB,,, | Performed by: INTERNAL MEDICINE

## 2022-11-07 PROCEDURE — 72100 XR LUMBAR SPINE AP AND LATERAL: ICD-10-PCS | Mod: 26,,, | Performed by: RADIOLOGY

## 2022-11-07 PROCEDURE — 99214 PR OFFICE/OUTPT VISIT, EST, LEVL IV, 30-39 MIN: ICD-10-PCS | Mod: S$GLB,,, | Performed by: INTERNAL MEDICINE

## 2022-11-07 PROCEDURE — 99999 PR PBB SHADOW E&M-EST. PATIENT-LVL III: ICD-10-PCS | Mod: PBBFAC,,, | Performed by: INTERNAL MEDICINE

## 2022-11-07 PROCEDURE — 99999 PR PBB SHADOW E&M-EST. PATIENT-LVL III: CPT | Mod: PBBFAC,,, | Performed by: INTERNAL MEDICINE

## 2022-11-07 PROCEDURE — 3008F BODY MASS INDEX DOCD: CPT | Mod: CPTII,S$GLB,, | Performed by: INTERNAL MEDICINE

## 2022-11-07 PROCEDURE — 1159F MED LIST DOCD IN RCRD: CPT | Mod: CPTII,S$GLB,, | Performed by: INTERNAL MEDICINE

## 2022-11-07 PROCEDURE — 3008F PR BODY MASS INDEX (BMI) DOCUMENTED: ICD-10-PCS | Mod: CPTII,S$GLB,, | Performed by: INTERNAL MEDICINE

## 2022-11-07 PROCEDURE — 72100 X-RAY EXAM L-S SPINE 2/3 VWS: CPT | Mod: 26,,, | Performed by: RADIOLOGY

## 2022-11-07 PROCEDURE — 72100 X-RAY EXAM L-S SPINE 2/3 VWS: CPT | Mod: TC,FY,PO

## 2022-11-07 RX ORDER — GABAPENTIN 300 MG/1
300 CAPSULE ORAL 3 TIMES DAILY
COMMUNITY

## 2022-11-07 RX ORDER — METHYLPREDNISOLONE 4 MG/1
4 TABLET ORAL
COMMUNITY

## 2022-11-07 RX ORDER — CYCLOBENZAPRINE HCL 10 MG
10 TABLET ORAL 3 TIMES DAILY PRN
COMMUNITY

## 2022-11-07 RX ORDER — TRAMADOL HYDROCHLORIDE 50 MG/1
50 TABLET ORAL EVERY 6 HOURS PRN
COMMUNITY
Start: 2022-11-04

## 2022-11-07 RX ORDER — KETOROLAC TROMETHAMINE 5 MG/ML
1 SOLUTION OPHTHALMIC NIGHTLY
COMMUNITY
Start: 2022-09-14

## 2022-11-07 RX ORDER — PREDNISOLONE ACETATE 10 MG/ML
1 SUSPENSION/ DROPS OPHTHALMIC NIGHTLY
COMMUNITY
Start: 2022-09-14

## 2022-11-07 RX ORDER — LISINOPRIL AND HYDROCHLOROTHIAZIDE 12.5; 2 MG/1; MG/1
1 TABLET ORAL DAILY
COMMUNITY
Start: 2022-10-21

## 2022-11-07 NOTE — PROGRESS NOTES
RHEUMATOLOGY CLINIC INITIAL VISIT    Reason for consult:-  Joint pain, back pain.    Chief complaints, HPI, ROS, EXAM, Assessment & Plans:-  Emilie Markham a 57 y.o. pleasant female comes in with worsening pain.  Longstanding history of large joint pain worsened by activity diagnosed with osteoarthritis, gets corticosteroid and hyaluronic acid injection in the knee given by orthopedic surgeon.  Also longstanding history of midthoracic and lower back pain diagnosed with degenerative disc disease of midthoracic region after possible trauma at work followed by external orthopedic surgeon.  MRI shows degenerative disc disease thoracic region.  Comes in today to make sure that she does not have any underlying autoimmune disease similar to her niece .  Her Niece is my patient  who has systemic lupus erythematosus.  But she is not biologically related to the patient.  No chronic sicca syndrome.  No Raynaud's.  No photosensitive rash.  Rheumatological review of system negative otherwise.  Exam shows crepitus and tenderness of large joints.  No small joint synovitis.  No dactylitis.  No enthesitis.  Significant lower back tenderness.  1. Chronic midline low back pain without sciatica    2. Polyarthralgia      Problem List Items Addressed This Visit       Chronic midline low back pain without sciatica - Primary    Relevant Orders    EDWARD Screen w/Reflex    Cyclic Citrullinated Peptide Antibody, IgG    C-Reactive Protein    Sedimentation rate    Rheumatoid Factor    HLA B27 Antigen    Comprehensive Metabolic Panel    X-Ray Lumbar Spine AP And Lateral    Ambulatory referral/consult to Back & Spine Clinic     Other Visit Diagnoses       Polyarthralgia        Relevant Orders    EDWARD Screen w/Reflex    Cyclic Citrullinated Peptide Antibody, IgG    C-Reactive Protein    Sedimentation rate    Rheumatoid Factor    HLA B27 Antigen    Comprehensive Metabolic Panel            Based on her history and physical examination pretest  probability for underlying autoimmune inflammatory connective tissue disease is very low.  No evidence of lupus, rheumatoid, Sjogren's, scleroderma, myositis or psoriatic arthritis.  History of elevated C-reactive protein.  Repeat inflammatory markers and serologies today.  X-ray lower lumbar spine and consult back and spine clinic for worsening lumbago.  Continue follow-up with orthopedic surgeon for large joint osteoarthritis.      Past Medical History:   Diagnosis Date    Hypertension        Past Surgical History:   Procedure Laterality Date    KNEE CARTILAGE SURGERY Bilateral     RETINAL DETACHMENT SURGERY Right         Social History     Tobacco Use    Smoking status: Never    Smokeless tobacco: Never   Substance Use Topics    Alcohol use: Yes    Drug use: No       Family History   Problem Relation Age of Onset    Hypertension Mother     Diabetes Mother     Hypertension Brother     Diabetes Brother     Hypertension Sister     Breast cancer Neg Hx     Colon cancer Neg Hx     Ovarian cancer Neg Hx        Review of patient's allergies indicates:  No Known Allergies    Medication List with Changes/Refills   Current Medications    CYCLOBENZAPRINE (FLEXERIL) 10 MG TABLET    Take 10 mg by mouth 3 (three) times daily as needed for Muscle spasms.    GABAPENTIN (NEURONTIN) 300 MG CAPSULE    Take 300 mg by mouth 3 (three) times daily.    KETOROLAC 0.5% (ACULAR) 0.5 % DROP    Place 1 drop into both eyes every evening.    LISINOPRIL-HYDROCHLOROTHIAZIDE (PRINZIDE,ZESTORETIC) 20-12.5 MG PER TABLET    Take 1 tablet by mouth once daily.    METHYLPREDNISOLONE (MEDROL DOSEPACK) 4 MG TABLET    Take 4 mg by mouth. use as directed    PREDNISOLONE ACETATE (PRED FORTE) 1 % DRPS    Place 1 drop into both eyes every evening.    TRAMADOL (ULTRAM) 50 MG TABLET    Take 50 mg by mouth every 6 (six) hours as needed.   Discontinued Medications    GABAPENTIN (NEURONTIN) 300 MG CAPSULE    Take 300 mg by mouth 3 (three) times daily.    IBUPROFEN  (ADVIL,MOTRIN) 800 MG TABLET    Take 1 tablet (800 mg total) by mouth every 6 (six) hours as needed for Pain.    LOSARTAN (COZAAR) 50 MG TABLET    Take 1 tablet (50 mg total) by mouth once daily.         Thank you for allowing me to participate in the care Geo Taveras.    Disclaimer: This note was prepared using voice recognition system and is likely to have sound alike errors and is not proof read.  Please call me with any questions.

## 2022-11-08 ENCOUNTER — TELEPHONE (OUTPATIENT)
Dept: PAIN MEDICINE | Facility: CLINIC | Age: 57
End: 2022-11-08
Payer: COMMERCIAL

## 2022-11-17 ENCOUNTER — TELEPHONE (OUTPATIENT)
Dept: PAIN MEDICINE | Facility: CLINIC | Age: 57
End: 2022-11-17
Payer: COMMERCIAL

## 2022-11-17 NOTE — TELEPHONE ENCOUNTER
Reached out to the patient to schedule an appointment with one of our pain providers. Patient was informed: as interventional pain management, we focus on injections/ procedures, nerve blocks, non-opioid medications, and other interventional treatment options.  If patient is seeking medication management only, we refer these services to an outside colleagues.   Pt decline because she lives to far. Patient understands.  All questions answered.     Sergio Rock  Medical Assistant

## 2023-06-22 ENCOUNTER — TELEPHONE (OUTPATIENT)
Dept: RHEUMATOLOGY | Facility: CLINIC | Age: 58
End: 2023-06-22
Payer: COMMERCIAL

## 2023-06-22 NOTE — TELEPHONE ENCOUNTER
----- Message from Shelbie Baptiste sent at 6/22/2023 12:46 PM CDT -----  Contact: Self  Patient is calling to speak with a nurse concerning a referral to a dr in [place]. Please call back [phone]

## 2023-06-22 NOTE — TELEPHONE ENCOUNTER
Patient has a sister that lives in [place] and was wondering is there were any rheumatologist that Dr DANIELS would recommend.  Dr Crabtree's name and number given

## 2025-08-11 ENCOUNTER — LAB VISIT (OUTPATIENT)
Dept: LAB | Facility: HOSPITAL | Age: 60
End: 2025-08-11
Payer: COMMERCIAL

## 2025-08-11 ENCOUNTER — OFFICE VISIT (OUTPATIENT)
Dept: INFECTIOUS DISEASES | Facility: CLINIC | Age: 60
End: 2025-08-11

## 2025-08-11 ENCOUNTER — CLINICAL SUPPORT (OUTPATIENT)
Dept: INFECTIOUS DISEASES | Facility: CLINIC | Age: 60
End: 2025-08-11
Payer: COMMERCIAL

## 2025-08-11 VITALS
DIASTOLIC BLOOD PRESSURE: 78 MMHG | TEMPERATURE: 98 F | SYSTOLIC BLOOD PRESSURE: 155 MMHG | BODY MASS INDEX: 33.04 KG/M2 | HEART RATE: 83 BPM | WEIGHT: 211 LBS

## 2025-08-11 DIAGNOSIS — Z71.84 ENCOUNTER FOR COUNSELING FOR TRAVEL: Primary | ICD-10-CM

## 2025-08-11 DIAGNOSIS — G89.29 CHRONIC MIDLINE LOW BACK PAIN WITHOUT SCIATICA: ICD-10-CM

## 2025-08-11 DIAGNOSIS — M54.50 CHRONIC MIDLINE LOW BACK PAIN WITHOUT SCIATICA: ICD-10-CM

## 2025-08-11 DIAGNOSIS — M25.50 POLYARTHRALGIA: ICD-10-CM

## 2025-08-11 DIAGNOSIS — Z71.84 ENCOUNTER FOR COUNSELING FOR TRAVEL: ICD-10-CM

## 2025-08-11 LAB
ALBUMIN SERPL BCP-MCNC: 4 G/DL (ref 3.5–5.2)
ALP SERPL-CCNC: 92 UNIT/L (ref 40–150)
ALT SERPL W/O P-5'-P-CCNC: 14 UNIT/L (ref 0–55)
ANION GAP (OHS): 6 MMOL/L (ref 8–16)
AST SERPL-CCNC: 20 UNIT/L (ref 0–50)
BILIRUB SERPL-MCNC: 0.4 MG/DL (ref 0.1–1)
BUN SERPL-MCNC: 15 MG/DL (ref 8–23)
CALCIUM SERPL-MCNC: 9.1 MG/DL (ref 8.7–10.5)
CHLORIDE SERPL-SCNC: 110 MMOL/L (ref 95–110)
CO2 SERPL-SCNC: 26 MMOL/L (ref 23–29)
CREAT SERPL-MCNC: 0.7 MG/DL (ref 0.5–1.4)
GFR SERPLBLD CREATININE-BSD FMLA CKD-EPI: >60 ML/MIN/1.73/M2
GLUCOSE SERPL-MCNC: 81 MG/DL (ref 70–110)
HAV AB SER QL IA: REACTIVE
HBV CORE AB SERPL QL IA: NORMAL
HBV SURFACE AB SER-ACNC: 17.83 MIU/ML
HBV SURFACE AB SERPL IA-ACNC: REACTIVE M[IU]/ML
HBV SURFACE AG SERPL QL IA: NORMAL
POTASSIUM SERPL-SCNC: 4.1 MMOL/L (ref 3.5–5.1)
PROT SERPL-MCNC: 7.7 GM/DL (ref 6–8.4)
SODIUM SERPL-SCNC: 142 MMOL/L (ref 136–145)

## 2025-08-11 PROCEDURE — 82040 ASSAY OF SERUM ALBUMIN: CPT

## 2025-08-11 PROCEDURE — 87340 HEPATITIS B SURFACE AG IA: CPT

## 2025-08-11 PROCEDURE — 36415 COLL VENOUS BLD VENIPUNCTURE: CPT

## 2025-08-11 PROCEDURE — 86704 HEP B CORE ANTIBODY TOTAL: CPT

## 2025-08-11 PROCEDURE — 99999 PR PBB SHADOW E&M-EST. PATIENT-LVL I: CPT | Mod: PBBFAC,,,

## 2025-08-11 PROCEDURE — 90717 YELLOW FEVER VACCINE SUBQ: CPT | Mod: S$GLB,,, | Performed by: INTERNAL MEDICINE

## 2025-08-11 PROCEDURE — 86706 HEP B SURFACE ANTIBODY: CPT | Mod: 59

## 2025-08-11 PROCEDURE — 99999 PR PBB SHADOW E&M-EST. PATIENT-LVL III: CPT | Mod: PBBFAC,,,

## 2025-08-11 PROCEDURE — 86790 VIRUS ANTIBODY NOS: CPT

## 2025-08-11 PROCEDURE — 90471 IMMUNIZATION ADMIN: CPT | Mod: S$GLB,,, | Performed by: INTERNAL MEDICINE

## 2025-08-11 RX ORDER — AZITHROMYCIN 500 MG/1
500 TABLET, FILM COATED ORAL DAILY
Qty: 3 TABLET | Refills: 0 | Status: SHIPPED | OUTPATIENT
Start: 2025-08-11 | End: 2025-08-14

## 2025-08-11 RX ORDER — ATOVAQUONE AND PROGUANIL HYDROCHLORIDE 250; 100 MG/1; MG/1
1 TABLET, FILM COATED ORAL DAILY
Qty: 20 TABLET | Refills: 0 | Status: SHIPPED | OUTPATIENT
Start: 2025-08-11 | End: 2025-08-31

## 2025-08-11 RX ORDER — AMLODIPINE BESYLATE 5 MG/1
5 TABLET ORAL
COMMUNITY
Start: 2025-05-19

## 2025-08-12 ENCOUNTER — RESULTS FOLLOW-UP (OUTPATIENT)
Dept: INFECTIOUS DISEASES | Facility: CLINIC | Age: 60
End: 2025-08-12
Payer: COMMERCIAL